# Patient Record
Sex: MALE | Race: WHITE | NOT HISPANIC OR LATINO | URBAN - METROPOLITAN AREA
[De-identification: names, ages, dates, MRNs, and addresses within clinical notes are randomized per-mention and may not be internally consistent; named-entity substitution may affect disease eponyms.]

---

## 2022-01-01 ENCOUNTER — OFFICE VISIT (OUTPATIENT)
Dept: FAMILY MEDICINE | Facility: CLINIC | Age: 62
End: 2022-01-01
Payer: COMMERCIAL

## 2022-01-01 ENCOUNTER — PRE VISIT (OUTPATIENT)
Dept: ORTHOPEDICS | Facility: CLINIC | Age: 62
End: 2022-01-01

## 2022-01-01 ENCOUNTER — OFFICE VISIT (OUTPATIENT)
Dept: URGENT CARE | Facility: URGENT CARE | Age: 62
End: 2022-01-01
Payer: COMMERCIAL

## 2022-01-01 VITALS
BODY MASS INDEX: 25.83 KG/M2 | HEIGHT: 70 IN | HEART RATE: 94 BPM | RESPIRATION RATE: 20 BRPM | OXYGEN SATURATION: 99 % | WEIGHT: 180.4 LBS | DIASTOLIC BLOOD PRESSURE: 78 MMHG | SYSTOLIC BLOOD PRESSURE: 114 MMHG | TEMPERATURE: 97.1 F

## 2022-01-01 VITALS
DIASTOLIC BLOOD PRESSURE: 87 MMHG | HEART RATE: 99 BPM | OXYGEN SATURATION: 97 % | SYSTOLIC BLOOD PRESSURE: 123 MMHG | TEMPERATURE: 97.7 F

## 2022-01-01 DIAGNOSIS — L98.9 SKIN LESION: ICD-10-CM

## 2022-01-01 DIAGNOSIS — M19.011 GLENOHUMERAL ARTHRITIS, RIGHT: Primary | ICD-10-CM

## 2022-01-01 DIAGNOSIS — M25.511 ACUTE PAIN OF RIGHT SHOULDER: ICD-10-CM

## 2022-01-01 DIAGNOSIS — M25.511 RIGHT SHOULDER PAIN: Primary | ICD-10-CM

## 2022-01-01 DIAGNOSIS — N52.9 ERECTILE DYSFUNCTION, UNSPECIFIED ERECTILE DYSFUNCTION TYPE: ICD-10-CM

## 2022-01-01 DIAGNOSIS — F17.219 CIGARETTE NICOTINE DEPENDENCE WITH NICOTINE-INDUCED DISORDER: Primary | ICD-10-CM

## 2022-01-01 DIAGNOSIS — E78.5 DYSLIPIDEMIA: ICD-10-CM

## 2022-01-01 LAB
CHOLEST SERPL-MCNC: 216 MG/DL
FASTING STATUS PATIENT QL REPORTED: NO
HDLC SERPL-MCNC: 50 MG/DL
LDLC SERPL CALC-MCNC: 105 MG/DL
NONHDLC SERPL-MCNC: 166 MG/DL
TRIGL SERPL-MCNC: 307 MG/DL

## 2022-01-01 PROCEDURE — 36415 COLL VENOUS BLD VENIPUNCTURE: CPT | Performed by: INTERNAL MEDICINE

## 2022-01-01 PROCEDURE — 99214 OFFICE O/P EST MOD 30 MIN: CPT | Performed by: INTERNAL MEDICINE

## 2022-01-01 PROCEDURE — 99203 OFFICE O/P NEW LOW 30 MIN: CPT | Performed by: PHYSICIAN ASSISTANT

## 2022-01-01 PROCEDURE — 80061 LIPID PANEL: CPT | Performed by: INTERNAL MEDICINE

## 2022-01-01 RX ORDER — LISDEXAMFETAMINE DIMESYLATE 70 MG/1
CAPSULE ORAL
COMMUNITY
Start: 2022-01-01

## 2022-01-01 RX ORDER — ATORVASTATIN CALCIUM 20 MG/1
20 TABLET, FILM COATED ORAL EVERY EVENING
Qty: 30 TABLET | Refills: 0 | Status: CANCELLED | OUTPATIENT
Start: 2022-01-01

## 2022-01-01 RX ORDER — GUANFACINE 1 MG/1
1 TABLET ORAL DAILY
Qty: 30 TABLET | Refills: 1 | Status: CANCELLED | OUTPATIENT
Start: 2022-01-01

## 2022-01-01 RX ORDER — ALBUTEROL SULFATE 90 UG/1
2 AEROSOL, METERED RESPIRATORY (INHALATION) EVERY 4 HOURS PRN
Status: CANCELLED | OUTPATIENT
Start: 2022-01-01

## 2022-01-01 RX ORDER — SILDENAFIL 100 MG/1
100 TABLET, FILM COATED ORAL DAILY PRN
Qty: 30 TABLET | Refills: 3 | Status: SHIPPED | OUTPATIENT
Start: 2022-01-01

## 2022-01-01 RX ORDER — PYRIDOXINE HCL (VITAMIN B6) 50 MG
50 TABLET ORAL DAILY
Qty: 30 TABLET | Refills: 0 | Status: CANCELLED | OUTPATIENT
Start: 2022-01-01

## 2022-01-01 RX ORDER — ATORVASTATIN CALCIUM 20 MG/1
20 TABLET, FILM COATED ORAL DAILY
Qty: 90 TABLET | Refills: 3 | Status: SHIPPED | OUTPATIENT
Start: 2022-01-01

## 2022-01-01 RX ORDER — LISDEXAMFETAMINE DIMESYLATE 70 MG/1
CAPSULE ORAL
Status: CANCELLED | OUTPATIENT
Start: 2022-01-01

## 2022-01-01 RX ORDER — FLUTICASONE PROPIONATE 50 MCG
1-2 SPRAY, SUSPENSION (ML) NASAL DAILY
Qty: 16 G | Refills: 3 | Status: CANCELLED | OUTPATIENT
Start: 2022-01-01

## 2022-01-01 ASSESSMENT — PAIN SCALES - GENERAL: PAINLEVEL: NO PAIN (0)

## 2022-01-01 ASSESSMENT — ENCOUNTER SYMPTOMS: ARTHRALGIAS: 1

## 2022-08-02 NOTE — LETTER
August 2, 2022      Joseph Souza  [address] [facility] [address]        To Whom It May Concern:    Joseph Souza was seen in our clinic today. He may return to school without restrictions.      Sincerely,        Alis Devine

## 2022-08-03 NOTE — PROGRESS NOTES
SUBJECTIVE:   Joseph Souza is a 61 year old male presenting with a chief complaint of   Chief Complaint   Patient presents with     Urgent Care     Spot on forehead x 2 months       He is a new patient of [facility].  As above.  No other lesions.  No itching or pain.  States bleeds from time to time (2) patient also here for right shoulder pain.  Per his  needs to be taken care of immediately.  Patient states no pain for last 2 years.  RHD.  States he's not sure if he has a PCP, we will set him up with one.  He is asking for a note fore school.      Treatment:  etoh wipes.        Review of Systems   Musculoskeletal: Positive for arthralgias.   Skin: Positive for rash.   All other systems reviewed and are negative.      Past Medical History:   Diagnosis Date     Anxiety      Depressive disorder      Substance abuse (H)     Stimulants abuse in the past.      Family History   Problem Relation Age of Onset     Glaucoma No family hx of      Macular Degeneration No family hx of      Current Outpatient Medications   Medication Sig Dispense Refill     buPROPion (WELLBUTRIN XL) 300 MG 24 hr tablet Take 1 tablet (300 mg) by mouth daily 30 tablet 0     pyridOXINE (VITAMIN B-6) 50 MG tablet Take 1 tablet (50 mg) by mouth daily 30 tablet 0     VYVANSE 70 MG capsule TAKE 1 BY MOUTH EVERY MORNING       albuterol (ALBUTEROL) 108 (90 BASE) MCG/ACT inhaler Inhale 2 puffs into the lungs every 4 hours as needed for shortness of breath / dyspnea (Patient not taking: Reported on 8/2/2022) 1 Inhaler 0     atorvastatin (LIPITOR) 20 MG tablet Take 1 tablet (20 mg) by mouth every evening (Patient not taking: Reported on 8/2/2022) 30 tablet 0     cetirizine (ZYRTEC) 10 MG tablet Take 1 tablet (10 mg) by mouth every evening (Patient not taking: Reported on 8/2/2022) 30 tablet 3     cyanocobalamin 1000 MCG TABS Take 1,000 mcg by mouth daily (Patient not taking: Reported on 8/2/2022) 30 tablet 0     fluticasone (FLONASE) 50 MCG/ACT  nasal spray Spray 1-2 sprays into both nostrils daily (Patient not taking: Reported on 8/2/2022) 16 g 3     guanFACINE (TENEX) 1 MG tablet Take 1 tablet (1 mg) by mouth daily (Patient not taking: Reported on 8/2/2022) 30 tablet 1     Social History     Tobacco Use     Smoking status: Current Every Day Smoker     Packs/day: 0.25     Years: 20.00     Pack years: 5.00     Smokeless tobacco: Never Used   Substance Use Topics     Alcohol use: No       OBJECTIVE  /87 (BP Location: Left arm, Patient Position: Sitting, Cuff Size: Adult Regular)   Pulse 99   Temp 97.7  F (36.5  C) (Oral)   SpO2 97%     Physical Exam  Vitals reviewed.   Constitutional:       Appearance: Normal appearance. He is normal weight.   Eyes:      Extraocular Movements: Extraocular movements intact.      Conjunctiva/sclera: Conjunctivae normal.   Cardiovascular:      Rate and Rhythm: Normal rate.   Musculoskeletal:      Comments: Right shoulder with full ROM.  Radial pulse.  Tenderness to palpation of anterior shoulder.  Pain and weakness with supraspinatus testing.     Skin:     Capillary Refill: Capillary refill takes less than 2 seconds.      Comments: Mid forehead with an umbilicated lesion approximately 3-5 mm.  Not currently bleeding.     Neurological:      Mental Status: He is alert.         Labs:  No results found for this or any previous visit (from the past 24 hour(s)).    X-Ray was not done.    ASSESSMENT:      ICD-10-CM    1. Cigarette nicotine dependence with nicotine-induced disorder  F17.219    2. Acute pain of right shoulder  M25.511 Orthopedic  Referral   3. Skin lesion  L98.9 Adult Dermatology Referral        Medical Decision Making:    Differential Diagnosis:  RTC tear, basal cell, squamous cell    Serious Comorbid Conditions:  Adult:  reviewed    PLAN:    Derm referral.  Ortho referral.  Patient brought up to  to make appointment with PCP.  Patient education.      Followup:    If not improving or if  condition worsens, follow up with your Primary Care Provider, If not improving or if conditions worsens over the next 12-24 hours, go to the Emergency Department    There are no Patient Instructions on file for this visit.

## 2022-08-05 NOTE — LETTER
August 8, 2022      Joseph Souza  [address] [facility] [address]        Dear ,    We are writing to inform you of your test results.    Your Cholesterol is a bit high.  I will send in a prescription for atorvastatin.       Resulted Orders   Lipid panel reflex to direct LDL Fasting   Result Value Ref Range    Cholesterol 216 (H) <200 mg/dL    Triglycerides 307 (H) <150 mg/dL    Direct Measure HDL 50 >=40 mg/dL    LDL Cholesterol Calculated 105 (H) <=100 mg/dL    Non HDL Cholesterol 166 (H) <130 mg/dL    Patient Fasting > 8hrs? No     Narrative    Cholesterol  Desirable:  <200 mg/dL    Triglycerides  Normal:  Less than 150 mg/dL  Borderline High:  150-199 mg/dL  High:  200-499 mg/dL  Very High:  Greater than or equal to 500 mg/dL    Direct Measure HDL  Female:  Greater than or equal to 50 mg/dL   Male:  Greater than or equal to 40 mg/dL    LDL Cholesterol  Desirable:  <100mg/dL  Above Desirable:  100-129 mg/dL   Borderline High:  130-159 mg/dL   High:  160-189 mg/dL   Very High:  >= 190 mg/dL    Non HDL Cholesterol  Desirable:  130 mg/dL  Above Desirable:  130-159 mg/dL  Borderline High:  160-189 mg/dL  High:  190-219 mg/dL  Very High:  Greater than or equal to 220 mg/dL       If you have any questions or concerns, please call the clinic at the number listed above.       Sincerely,      Aron Schwartz MD

## 2022-08-05 NOTE — PROGRESS NOTES
"  Assessment & Plan    New patient.  MMP and he has a med list that is longer than we had time to sift through today.  He does see a psychiatrist and gets sporadic other care elsewhere.    He asks about getting refills on other medications. I suggest a RTC to discuss individually.      Glenohumeral arthritis, right  Conservative care discussed. Patient would prefer direct intervention.  Suggested xrays but he feels ortho will likely do their own and thus cut straight to referral.    - Orthopedic  Referral    Skin lesion  SCC? BCC?  Refer for Bx/treatment.    - Adult Dermatology Referral    Erectile dysfunction, unspecified erectile dysfunction type  Med rx.  Risks and SE discussed.    - sildenafil (VIAGRA) 100 MG tablet  Dispense: 30 tablet; Refill: 3    Dyslipidemia  Check lipids. Can consider statin if indicated. He had been on 20mg atorvastatin in the past.   - Lipid panel reflex to direct LDL Fasting  - Lipid panel reflex to direct LDL Fasting               Nicotine/Tobacco Cessation:  He reports that he has been smoking. He has a 5.00 pack-year smoking history. He has never used smokeless tobacco.  Nicotine/Tobacco Cessation Plan:         BMI:   Estimated body mass index is 25.88 kg/m  as calculated from the following:    Height as of this encounter: 1.778 m (5' 10\").    Weight as of this encounter: 81.8 kg (180 lb 6.4 oz).     No follow-ups on file.    Aron Schwartz MD  [facility] AMANDA Ruiz is a 61 year old, presenting for the following health issues:  Musculoskeletal Problem (Right shoulder pain - per  need MRI, has limited motion ), Derm Problem (Spot on forehead x 6 months has gotten bigger - concern due to family hx of skin cancer ), and Erectile Dysfunction      HPI   New to me as well as the clinic/system.   He has TBI and sees a psychiatrist and therapist.  Here for acute issues today.    R shoulder pain:  Limited ROM.  Has been working with a " " who thinks he may have an issue.  Pain does not awaken him from sleep.  His  suggests an MRI    Skin lesion:  Forehead.  Ongoing for over a year.  Has been growing.  Sometimes bleeds a touch and scabs.  No prior h/o skin CA.      ED:  He notes reduced libido but also ED.  States GF is 25 years younger.  Got some viagra online and he noted desired effect with no ill side effects    HL:  He had been on a statin at some point,  Not taking anything at present.  Requests refill but doesn't know what his cholesterol is.        Review of Systems         Objective    /78 (BP Location: Left arm, Patient Position: Sitting, Cuff Size: Adult Large)   Pulse 94   Temp 97.1  F (36.2  C) (Temporal)   Resp 20   Ht 1.778 m (5' 10\")   Wt 81.8 kg (180 lb 6.4 oz)   SpO2 99%   BMI 25.88 kg/m    Body mass index is 25.88 kg/m .  Physical Exam   GENERAL: healthy, alert and no distress  NECK: no adenopathy, no asymmetry, masses, or scars and thyroid normal to palpation  RESP: lungs clear to auscultation - no rales, rhonchi or wheezes  CV: regular rate and rhythm, normal S1 S2, no S3 or S4, no murmur, click or rub, no peripheral edema and peripheral pulses strong  ABDOMEN: soft, nontender, no hepatosplenomegaly, no masses and bowel sounds normal  R SHOULDER:  Neer negative.  Pain over GH.    SKIN:  Mounded lesion pedro 1cm with central scab forehead.                      .  ..  "

## 2022-08-11 NOTE — TELEPHONE ENCOUNTER
DIAGNOSIS: Glenohumeral arthritis, right shoulder/ Dr Schwartz   APPOINTMENT DATE: 8.22.22   NOTES STATUS DETAILS   OFFICE NOTE from referring provider Internal 8.5.22 Dr Aron Schwartz, [facility]   OFFICE NOTE from other specialist Internal 8.22.22 [facility]   MEDICATION LIST Internal    XRAYS (IMAGES & REPORTS) In process 5.5.11 B shoulder, [facility]     Action 8.11.22 8:22 AM YULIA   Action Taken Faxed request [place] [facility] [place] [phone]